# Patient Record
Sex: FEMALE | Race: WHITE | NOT HISPANIC OR LATINO
[De-identification: names, ages, dates, MRNs, and addresses within clinical notes are randomized per-mention and may not be internally consistent; named-entity substitution may affect disease eponyms.]

---

## 2019-04-03 ENCOUNTER — MED ADMIN CHARGE (OUTPATIENT)
Age: 84
End: 2019-04-03

## 2019-04-03 ENCOUNTER — RX RENEWAL (OUTPATIENT)
Age: 84
End: 2019-04-03

## 2019-04-03 PROBLEM — Z00.00 ENCOUNTER FOR PREVENTIVE HEALTH EXAMINATION: Status: ACTIVE | Noted: 2019-04-03

## 2019-04-03 RX ORDER — MONTELUKAST 10 MG/1
10 TABLET, FILM COATED ORAL
Qty: 90 | Refills: 3 | Status: ACTIVE | COMMUNITY
Start: 2019-04-03 | End: 1900-01-01

## 2019-12-03 ENCOUNTER — APPOINTMENT (OUTPATIENT)
Dept: OTOLARYNGOLOGY | Facility: CLINIC | Age: 84
End: 2019-12-03
Payer: MEDICARE

## 2019-12-03 VITALS
HEART RATE: 79 BPM | BODY MASS INDEX: 30.65 KG/M2 | WEIGHT: 173 LBS | SYSTOLIC BLOOD PRESSURE: 105 MMHG | DIASTOLIC BLOOD PRESSURE: 50 MMHG | HEIGHT: 63 IN

## 2019-12-03 DIAGNOSIS — Z80.9 FAMILY HISTORY OF MALIGNANT NEOPLASM, UNSPECIFIED: ICD-10-CM

## 2019-12-03 DIAGNOSIS — Z78.9 OTHER SPECIFIED HEALTH STATUS: ICD-10-CM

## 2019-12-03 DIAGNOSIS — Z86.39 PERSONAL HISTORY OF OTHER ENDOCRINE, NUTRITIONAL AND METABOLIC DISEASE: ICD-10-CM

## 2019-12-03 DIAGNOSIS — Z87.891 PERSONAL HISTORY OF NICOTINE DEPENDENCE: ICD-10-CM

## 2019-12-03 DIAGNOSIS — Z87.09 PERSONAL HISTORY OF OTHER DISEASES OF THE RESPIRATORY SYSTEM: ICD-10-CM

## 2019-12-03 DIAGNOSIS — Z87.39 PERSONAL HISTORY OF OTHER DISEASES OF THE MUSCULOSKELETAL SYSTEM AND CONNECTIVE TISSUE: ICD-10-CM

## 2019-12-03 DIAGNOSIS — Z83.3 FAMILY HISTORY OF DIABETES MELLITUS: ICD-10-CM

## 2019-12-03 DIAGNOSIS — Z86.79 PERSONAL HISTORY OF OTHER DISEASES OF THE CIRCULATORY SYSTEM: ICD-10-CM

## 2019-12-03 DIAGNOSIS — Z82.49 FAMILY HISTORY OF ISCHEMIC HEART DISEASE AND OTHER DISEASES OF THE CIRCULATORY SYSTEM: ICD-10-CM

## 2019-12-03 DIAGNOSIS — H61.23 IMPACTED CERUMEN, BILATERAL: ICD-10-CM

## 2019-12-03 PROCEDURE — 69210 REMOVE IMPACTED EAR WAX UNI: CPT

## 2019-12-03 PROCEDURE — 31231 NASAL ENDOSCOPY DX: CPT

## 2019-12-03 PROCEDURE — 92567 TYMPANOMETRY: CPT

## 2019-12-03 PROCEDURE — 99213 OFFICE O/P EST LOW 20 MIN: CPT | Mod: 25

## 2019-12-03 PROCEDURE — 92557 COMPREHENSIVE HEARING TEST: CPT

## 2019-12-03 RX ORDER — MONTELUKAST 10 MG/1
10 TABLET, FILM COATED ORAL
Qty: 90 | Refills: 3 | Status: ACTIVE | COMMUNITY
Start: 2019-12-03 | End: 1900-01-01

## 2019-12-03 NOTE — PHYSICAL EXAM
[FreeTextEntry1] : \par The patient was alert and oriented and in no distress.\par Voice was clear.The patient looked less than the stated age she was using a walker for ambulation\par \par Face:\par The patient had no facial asymmetry or mass.\par The skin was unremarkable.\par \par Eyes:\par The pupils were equal round and reactive to light and accommodation.\par There was no significant nystagmus or disconjugate gaze noted.\par \par Nose: \par The external nose had no significant deformity.  There was no facial tenderness.  On anterior rhinoscopy, the nasal mucosa was clear.\par Nasal endoscopy:\par \par Nasal endoscopy was done with topical anesthesia and a flexible endoscope to evaluate for nasal polyps, chronic sinusitis and response to therapy.\par Endocoscopy was performed to inspect the interior of the nasal cavity, the nasal septum,  the middle and superior meati, the inferior, middle and superior turbinates, and the spheno-ethmoidal  recesses, the nasopharynx and eustachian tube orifices bilaterally\par Evaluation showed that the septum was midline.  There was no significant mucosal disease.  The inferior turbinates and middle turbinates were normal.  On both sides, the surgically opened ethmoids, antra, and frontal recesses were clear.  There was no evidence of polypoid recurrences and no purulence.  The mucosa was close to stage zero.  The nasopharynx was benign.    The middle and  superior meati were normal and the sphenoethmoidal recesses were without evidence of disease\par \par Oral cavity:\par The oral mucosa was normal.\par The oral and base of tongue were clear and without mass.\par The gingival and buccal mucosa were moist and without lesions.\par The palate moved well.\par There was no cleft to the palate.\par There appeared to be good salivary flow.  \par There was no pus, erythema or mass in the oral cavity.\par \par Ears:\par  Procedure note:\par  Removal of Cerumen Impactions, both ears:  39133-65\par Both external ears were normal.\par There were symptomatic cerumen impactions in both ears.  These were cleared microscopically without trauma using both suction and curettes.  After clearing,  both ear canals were clear both eardrums were intact and mobile.  \par \par Neck: \par The neck was symmetrical.\par The parotid and submandibular glands were normal without masses.\par The trachea was midline and there was no unusual crepitus.\par The thyroid was smooth and nontender and no masses were palpated.\par There was no significant cervical adenopathy.\par \par \par Neuro:\par Neurologically, the patient was awake, alert, and oriented to person, place and time. There were no obvious focal neurologic abnormalities.  Cranial nerves II through XII were grossly intact.\par \par \par TMJ:\par The temporomandibular joints were nontender.\par There was no abnormal crepitus and no significant malocclusion\par

## 2019-12-03 NOTE — ASSESSMENT
[FreeTextEntry1] : It was my impression that the patient had a cerumen impaction that was cleared.  I recommended topical moisturizing.  I recommended avoiding Q-tips.  I reviewed aural hygiene and the role of cerumen with the patient.  I suggested a repeat visit in a year or earlier should the need arise.\par Her sinuses looked excellent without recurrent polyps. 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 I did not recommend hearing aids at this point would suggest repeating her hearing test next year.  This was reviewed with Ms. Boss.

## 2019-12-03 NOTE — HISTORY OF PRESENT ILLNESS
[de-identified] : MANOHAR ARELLANO Was seen in followup on December 3. I had last seen her about a year ago. She has a history of pansinusitis and nasal polyps very she is doing well since her surgery and basically is asymptomatic without otitis known and monteleukast. She feels her hearing may have diminished some but denies otalgia otorrhea or vertigo.The patient had no other ear nose or throat complaints at this visit.

## 2019-12-03 NOTE — REASON FOR VISIT
[Subsequent Evaluation] : a subsequent evaluation for [Hearing Loss] : hearing loss [Sinusitis] : sinusitis

## 2019-12-03 NOTE — CONSULT LETTER
[FreeTextEntry2] : Dr. Adriana Saez [FreeTextEntry1] : \par \par Dear  Dr. Saez,\par \par I had the pleasure of seeing your patient today.  \par Please see my note below.\par \par \par Thank you very much for allowing me to participate in the care of your patient.\par \par Sincerely,\par \par \par Roberto Carlos Correa MD\par [facility]\par [facility]\par  [facility]\par \par

## 2020-05-29 ENCOUNTER — APPOINTMENT (OUTPATIENT)
Dept: OTOLARYNGOLOGY | Facility: CLINIC | Age: 85
End: 2020-05-29
Payer: MEDICARE

## 2020-05-29 DIAGNOSIS — J32.9 CHRONIC SINUSITIS, UNSPECIFIED: ICD-10-CM

## 2020-05-29 DIAGNOSIS — H90.3 SENSORINEURAL HEARING LOSS, BILATERAL: ICD-10-CM

## 2020-05-29 PROCEDURE — 99443: CPT | Mod: 95

## 2020-05-29 RX ORDER — AZELASTINE HYDROCHLORIDE AND FLUTICASONE PROPIONATE 137; 50 UG/1; UG/1
137-50 SPRAY, METERED NASAL
Qty: 1 | Refills: 5 | Status: ACTIVE | COMMUNITY
Start: 2020-05-29 | End: 1900-01-01

## 2020-05-29 NOTE — ASSESSMENT
[FreeTextEntry1] : It was my impression that she was doing well and may have an exacerbation of seasonal allergies.  I could not say whether there was a recurrence of her previous signficant crswnp.  \par I could not say about her headaches and some of this could also be from her dental infection as well.\par She will followup with her dentist and I suggested following with Dr. Saez for her headaches.\par I would like to examine to r/o recurrent polypoid disease when she is able.\par In the interim, I suggested continuing on the monteleukast and switching to Dymista or its components.\par She should also have her hearing re evaluated at that time \par \par \par Telemedicine 2 way audio only -  time 31 minutes

## 2020-05-29 NOTE — HISTORY OF PRESENT ILLNESS
[de-identified] : MANOHAR ARELLANO Was seen in followup on December 3. I had last seen her about a year ago. She has a history of pansinusitis and nasal polyps very she is doing well since her surgery and basically is asymptomatic without otitis known and monteleukast. She feels her hearing may have diminished some but denies otalgia otorrhea or vertigo.The patient had no other ear nose or throat complaints at this visit. [FreeTextEntry1] : Verbal consent given on 2020 at 10:21 by MANOHAR ARELLANO, [].\par MANOHAR ARELLANO was evaluated by two way secure telehealth audio only on May 29th.   Her  had  in January.  She has been home and not been ill.   She had a recent dental emergency and may need extraction.\par She notes a little more nasal congestion and cephalgia- more on the right.   Her blood pressure has been elevated, but this morning was 138/78.\par She denies nasal purulence or signficant congestion. \par She has been using Flonase and Monteleukast and feels her allergies are worse this time of year.

## 2020-05-29 NOTE — CONSULT LETTER
[FreeTextEntry1] : \par \par Dear  Dr. Saez,\par \par I had the pleasure of seeing your patient today.  \par Please see my note below.\par \par \par Thank you very much for allowing me to participate in the care of your patient.\par \par Sincerely,\par \par \par Roberto Carlos Correa MD\par [facility]\par [facility]\par  [facility]\par \par  [FreeTextEntry2] : Dr. Adriana Saez

## 2020-06-02 ENCOUNTER — APPOINTMENT (OUTPATIENT)
Dept: OTOLARYNGOLOGY | Facility: CLINIC | Age: 85
End: 2020-06-02

## 2020-07-30 ENCOUNTER — APPOINTMENT (OUTPATIENT)
Dept: OTOLARYNGOLOGY | Facility: CLINIC | Age: 85
End: 2020-07-30

## 2021-07-11 ENCOUNTER — RX RENEWAL (OUTPATIENT)
Age: 86
End: 2021-07-11

## 2022-08-12 ENCOUNTER — RX RENEWAL (OUTPATIENT)
Age: 87
End: 2022-08-12

## 2023-07-18 ENCOUNTER — RX RENEWAL (OUTPATIENT)
Age: 88
End: 2023-07-18

## 2023-07-18 RX ORDER — MONTELUKAST 10 MG/1
10 TABLET, FILM COATED ORAL
Qty: 90 | Refills: 3 | Status: ACTIVE | COMMUNITY
Start: 2020-05-29 | End: 1900-01-01

## 2024-09-26 ENCOUNTER — APPOINTMENT (OUTPATIENT)
Dept: OTOLARYNGOLOGY | Facility: CLINIC | Age: 89
End: 2024-09-26
Payer: MEDICARE

## 2024-09-26 DIAGNOSIS — J32.9 CHRONIC SINUSITIS, UNSPECIFIED: ICD-10-CM

## 2024-09-26 DIAGNOSIS — J30.0 VASOMOTOR RHINITIS: ICD-10-CM

## 2024-09-26 DIAGNOSIS — J33.9 CHRONIC SINUSITIS, UNSPECIFIED: ICD-10-CM

## 2024-09-26 DIAGNOSIS — H90.3 SENSORINEURAL HEARING LOSS, BILATERAL: ICD-10-CM

## 2024-09-26 PROCEDURE — G0268 REMOVAL OF IMPACTED WAX MD: CPT

## 2024-09-26 PROCEDURE — 92557 COMPREHENSIVE HEARING TEST: CPT

## 2024-09-26 PROCEDURE — 92567 TYMPANOMETRY: CPT

## 2024-09-26 PROCEDURE — 31231 NASAL ENDOSCOPY DX: CPT

## 2024-09-26 PROCEDURE — 99203 OFFICE O/P NEW LOW 30 MIN: CPT | Mod: 25

## 2024-09-26 RX ORDER — AMLODIPINE BESYLATE 5 MG/1
TABLET ORAL
Refills: 0 | Status: ACTIVE | COMMUNITY

## 2024-09-26 RX ORDER — PREDNISONE 50 MG/1
TABLET ORAL
Refills: 0 | Status: ACTIVE | COMMUNITY

## 2024-09-26 RX ORDER — IPRATROPIUM BROMIDE 42 UG/1
0.06 SPRAY NASAL 3 TIMES DAILY
Qty: 1 | Refills: 5 | Status: ACTIVE | COMMUNITY
Start: 2024-09-26 | End: 1900-01-01

## 2024-09-26 RX ORDER — PANTOPRAZOLE SODIUM 20 MG/1
TABLET, DELAYED RELEASE ORAL
Refills: 0 | Status: ACTIVE | COMMUNITY

## 2024-09-26 RX ORDER — DULOXETINE HCL 100 %
POWDER (GRAM) MISCELLANEOUS
Refills: 0 | Status: ACTIVE | COMMUNITY

## 2024-09-26 RX ORDER — RAMIPRIL 5 MG/1
CAPSULE ORAL
Refills: 0 | Status: ACTIVE | COMMUNITY

## 2024-09-26 NOTE — ASSESSMENT
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  The pathogenesis was discussed.  I recommended a trial of Atrovent  .06% as needed  and the option of posterior nasal nerve ablation by Clarifix and Rhinaer was discussed.  She has the history of recurrent pansinusitis with nasal polyps.   At this point her sinuses look quite good without evidence of recurrent disease and I felt she should continue on the prophylactic fluticasone.   She is taking montelukast which I had not originally prescribed for her.  I refilled it at her request but asked that she speak to her primary care physician about the indications and whether she needs to keep taking this  It was my impression that the patient had a cerumen impaction that was cleared.  I recommended topical moisturizing.  I recommended avoiding Q-tips.  I reviewed aural hygiene and the role of cerumen with the patient.  I suggested a repeat visit in a year or earlier should the need arise.  She had the slightly asymmetric sensorineural hearing loss is without much change in the last 4 years.  These are borderline for hearing aids and this was discussed

## 2024-09-26 NOTE — REVIEW OF SYSTEMS
[Post Nasal Drip] : post nasal drip [Hearing Loss] : hearing loss [Nasal Congestion] : nasal congestion [Negative] : Heme/Lymph

## 2024-09-26 NOTE — HISTORY OF PRESENT ILLNESS
[de-identified] : MANOHAR ARELLANO is a 89 year old female who comes in complaining of Several issues as far as the head and neck.  I had last seen her 4 years ago.  She has the previous history of pansinusitis and nasal polyps and has been using Flonase with good results.  She feels that her airway is good but she is complaining of a profuse watery nasal discharge for which she uses a box of Kleenex a day.  She is complaining of cerumen impactions and feels that her hearing has gotten worse.  The patient had no other ear nose or throat complaints at this visit.

## 2024-09-26 NOTE — CONSULT LETTER
[FreeTextEntry2] : BREANNA KEARNS [FreeTextEntry1] :   Dear  Dr. Isbell  I had the pleasure of seeing your patient today.   Please see my note below.   Thank you very much for allowing me to participate in the care of your patient.  Sincerely,  Roberto Carlos Correa MD [facility]  [facility]

## 2024-09-26 NOTE — PHYSICAL EXAM
[FreeTextEntry1] : General: The patient was alert and oriented and in no distress. Voice was clear.  She is using a walker for ambulation and comes in with her daughter  Ears:  Procedure note:  Removal of Cerumen Impactions, both ears:  20922-23 Both external ears were normal. There were symptomatic cerumen impactions in both ears.  These were cleared microscopically without trauma using both suction and curettes.  After clearing,  both ear canals were clear both eardrums were intact and mobile.   She had a small cyst on around the right external canal verbal consent was obtained and patient felt improvement after procedure  Oral cavity: The oral mucosa was normal. The oral and base of tongue were clear and without mass. The gingival and buccal mucosa were moist and without lesions. The palate moved well. There was no cleft to the palate. There appeared to be good salivary flow.   There was no pus, erythema or mass in the oral cavity.  Face: The patient had no facial asymmetry or mass. The skin was unremarkable.   Nasal endoscopy:  Nasal endoscopy was done with topical anesthesia and a flexible endoscope to evaluate for nasal polyps, chronic sinusitis and response to therapy. Endocoscopy was performed to inspect the interior of the nasal cavity, the nasal septum,  the middle and superior meati, the inferior, middle and superior turbinates, and the spheno-ethmoidal  recesses, the nasopharynx and eustachian tube orifices bilaterally  Endoscopy was done with Covid precautions and with video. All risks and benefits were discussed with the patient and consent obtained.   Evaluation showed that the septum was midline.  There was no significant mucosal disease.  The inferior turbinates and middle turbinates were normal.  On both sides, the surgically opened ethmoids, antra, and frontal recesses were clear.  There was no evidence of polypoid recurrences and no purulence.  The mucosa was close to stage zero.  The nasopharynx was benign.    The middle and  superior meati were normal and the sphenoethmoidal recesses were without evidence of disease. There were no recurrent polyps and the nasal mucous appeared normal.   [de-identified] : A complete audiogram was ordered, done and reviewed with the patient.  This showed the slightly asymmetric high-frequency sensorineural hearing losses, borderline for hearing aids and without much change in the last 4 years

## 2024-10-17 ENCOUNTER — APPOINTMENT (OUTPATIENT)
Dept: OTOLARYNGOLOGY | Facility: CLINIC | Age: 89
End: 2024-10-17
Payer: MEDICARE

## 2024-10-17 DIAGNOSIS — J33.9 CHRONIC SINUSITIS, UNSPECIFIED: ICD-10-CM

## 2024-10-17 DIAGNOSIS — J32.9 CHRONIC SINUSITIS, UNSPECIFIED: ICD-10-CM

## 2024-10-17 DIAGNOSIS — R05.9 COUGH, UNSPECIFIED: ICD-10-CM

## 2024-10-17 PROCEDURE — 99443: CPT | Mod: 93

## 2024-10-17 RX ORDER — AZITHROMYCIN 250 MG/1
250 TABLET, FILM COATED ORAL
Qty: 1 | Refills: 0 | Status: ACTIVE | COMMUNITY
Start: 2024-10-17 | End: 1900-01-01